# Patient Record
Sex: MALE | Race: OTHER | NOT HISPANIC OR LATINO | URBAN - METROPOLITAN AREA
[De-identification: names, ages, dates, MRNs, and addresses within clinical notes are randomized per-mention and may not be internally consistent; named-entity substitution may affect disease eponyms.]

---

## 2017-01-09 ENCOUNTER — EMERGENCY (EMERGENCY)
Facility: HOSPITAL | Age: 27
LOS: 1 days | Discharge: LEFT BEFORE DISCHARGE | End: 2017-01-09
Attending: EMERGENCY MEDICINE | Admitting: EMERGENCY MEDICINE
Payer: COMMERCIAL

## 2017-01-09 VITALS
RESPIRATION RATE: 18 BRPM | TEMPERATURE: 98 F | SYSTOLIC BLOOD PRESSURE: 131 MMHG | OXYGEN SATURATION: 100 % | HEART RATE: 61 BPM | DIASTOLIC BLOOD PRESSURE: 79 MMHG

## 2017-01-09 DIAGNOSIS — R20.0 ANESTHESIA OF SKIN: ICD-10-CM

## 2017-01-09 DIAGNOSIS — M79.604 PAIN IN RIGHT LEG: ICD-10-CM

## 2017-01-09 DIAGNOSIS — M79.89 OTHER SPECIFIED SOFT TISSUE DISORDERS: ICD-10-CM

## 2017-01-09 PROCEDURE — 99282 EMERGENCY DEPT VISIT SF MDM: CPT

## 2017-01-09 PROCEDURE — 99283 EMERGENCY DEPT VISIT LOW MDM: CPT

## 2017-01-09 NOTE — ED ADULT NURSE NOTE - OBJECTIVE STATEMENT
received pt in [place] [facility]. A&Ox3, presents to ed for c.o R thigh pain with c.o numbness to site x 1 week. pt reports pain began after arriving to [facility] last week. denies fevers or chills. denies cp, no sob. no n/v/d. no recent falls or trauma to leg. [facility] for [facility] r/o dvt. respirations even and unlabored. awaiting md gracia.

## 2017-01-09 NOTE — ED ADULT NURSE NOTE - CHIEF COMPLAINT QUOTE
I just took a trip to [facility] and back and I have been having pain and numbness to my right leg.  I went to [facility] and they sent me here.

## 2017-01-09 NOTE — ED PROVIDER NOTE - OBJECTIVE STATEMENT
27 yo male states he was at [facility] md for right leg pain/ swelling and numbness and was sent here to eval for DVT - recent long flight 18 hrs landed 1 day ago- dev right leg pain and numbness during flight--  no back pain or weakness of legs or feet- no hx of herniated discs

## 2017-01-09 NOTE — ED ADULT TRIAGE NOTE - CHIEF COMPLAINT QUOTE
I just took a trip to [facility] and back and I have been having pain and numbness to my right leg.  I went to [facility] and they sent me here.

## 2017-01-10 VITALS
SYSTOLIC BLOOD PRESSURE: 131 MMHG | HEART RATE: 54 BPM | DIASTOLIC BLOOD PRESSURE: 84 MMHG | OXYGEN SATURATION: 100 % | RESPIRATION RATE: 18 BRPM
